# Patient Record
Sex: MALE | Race: OTHER | NOT HISPANIC OR LATINO | URBAN - METROPOLITAN AREA
[De-identification: names, ages, dates, MRNs, and addresses within clinical notes are randomized per-mention and may not be internally consistent; named-entity substitution may affect disease eponyms.]

---

## 2017-01-06 ENCOUNTER — EMERGENCY (EMERGENCY)
Facility: HOSPITAL | Age: 42
LOS: 1 days | Discharge: HOME | End: 2017-01-06
Attending: EMERGENCY MEDICINE | Admitting: EMERGENCY MEDICINE
Payer: COMMERCIAL

## 2017-01-06 VITALS
SYSTOLIC BLOOD PRESSURE: 129 MMHG | RESPIRATION RATE: 18 BRPM | HEART RATE: 57 BPM | TEMPERATURE: 98 F | DIASTOLIC BLOOD PRESSURE: 81 MMHG | OXYGEN SATURATION: 97 %

## 2017-01-06 DIAGNOSIS — H92.03 OTALGIA, BILATERAL: ICD-10-CM

## 2017-01-06 PROCEDURE — 99053 MED SERV 10PM-8AM 24 HR FAC: CPT

## 2017-01-06 PROCEDURE — 99282 EMERGENCY DEPT VISIT SF MDM: CPT

## 2017-01-06 PROCEDURE — 99282 EMERGENCY DEPT VISIT SF MDM: CPT | Mod: 25

## 2017-01-06 NOTE — ED PROVIDER NOTE - PHYSICAL EXAMINATION
EAC with small amount cerumen near external portion canals though TM visualized AU and pearly in appearance

## 2017-01-29 ENCOUNTER — EMERGENCY (EMERGENCY)
Facility: HOSPITAL | Age: 42
LOS: 1 days | Discharge: HOME | End: 2017-01-29
Attending: EMERGENCY MEDICINE | Admitting: EMERGENCY MEDICINE
Payer: COMMERCIAL

## 2017-01-29 VITALS
SYSTOLIC BLOOD PRESSURE: 134 MMHG | TEMPERATURE: 97 F | DIASTOLIC BLOOD PRESSURE: 85 MMHG | HEART RATE: 68 BPM | RESPIRATION RATE: 18 BRPM | WEIGHT: 175.27 LBS

## 2017-01-29 DIAGNOSIS — H92.03 OTALGIA, BILATERAL: ICD-10-CM

## 2017-01-29 PROCEDURE — 99053 MED SERV 10PM-8AM 24 HR FAC: CPT

## 2017-01-29 PROCEDURE — 99282 EMERGENCY DEPT VISIT SF MDM: CPT

## 2017-01-29 PROCEDURE — 99282 EMERGENCY DEPT VISIT SF MDM: CPT | Mod: 25

## 2017-01-29 NOTE — ED PROVIDER NOTE - ENMT NEGATIVE STATEMENT, MLM
Nose: no nasal congestion and no nasal drainage.Mouth/Throat: no dysphagia, no hoarseness and no throat pain.Neck: no lumps, no pain, no stiffness and no swollen glands. ears- see HPI

## 2017-01-29 NOTE — ED PROVIDER NOTE - ENMT, MLM
Airway patent, Nasal mucosa clear. Mouth with normal mucosa. Throat has no vesicles, no oropharyngeal exudates and uvula is midline.  b/l ears- normal exam, no signsof otitis externa or media, no foreign body

## 2017-01-29 NOTE — ED PROVIDER NOTE - OBJECTIVE STATEMENT
42 yo male in the ER with b/l earaches since yesterday, Denies fever, chills, n/v, denies sore throat , nasal congestion, cough or PRINCE.   Pt appears well, had no recent swimming, had no trauma to his ears, denies  discharge from his ears or decreased hearing. .

## 2017-01-29 NOTE — ED PROVIDER NOTE - MEDICAL DECISION MAKING DETAILS
40 yo male, well appearing, sleeping in the chair right after being triaged. No distress noted. Pt c/o b/l earache since yesterday. exam- unremarkable, no signs of otitis media or externa, no foreign body in the canals. pt stable for discharge, ENT f/u recommended if symptoms persist.

## 2017-03-02 ENCOUNTER — EMERGENCY (EMERGENCY)
Facility: HOSPITAL | Age: 42
LOS: 1 days | Discharge: HOME | End: 2017-03-02
Attending: EMERGENCY MEDICINE | Admitting: EMERGENCY MEDICINE
Payer: COMMERCIAL

## 2017-03-02 VITALS
SYSTOLIC BLOOD PRESSURE: 121 MMHG | OXYGEN SATURATION: 97 % | RESPIRATION RATE: 16 BRPM | TEMPERATURE: 98 F | WEIGHT: 169.98 LBS | DIASTOLIC BLOOD PRESSURE: 77 MMHG | HEART RATE: 62 BPM

## 2017-03-02 DIAGNOSIS — Z79.2 LONG TERM (CURRENT) USE OF ANTIBIOTICS: ICD-10-CM

## 2017-03-02 DIAGNOSIS — H92.02 OTALGIA, LEFT EAR: ICD-10-CM

## 2017-03-02 DIAGNOSIS — Z79.899 OTHER LONG TERM (CURRENT) DRUG THERAPY: ICD-10-CM

## 2017-03-02 DIAGNOSIS — H66.92 OTITIS MEDIA, UNSPECIFIED, LEFT EAR: ICD-10-CM

## 2017-03-02 PROCEDURE — 99053 MED SERV 10PM-8AM 24 HR FAC: CPT

## 2017-03-02 PROCEDURE — 99283 EMERGENCY DEPT VISIT LOW MDM: CPT | Mod: 25

## 2017-03-02 NOTE — ED ADULT TRIAGE NOTE - CHIEF COMPLAINT QUOTE
pt complaining of 5/10 b/l ear pain x 3 days r/t wax build up which is muffling his hearing. Hx of same issue in the past.

## 2017-03-03 RX ORDER — AMOXICILLIN 250 MG/5ML
1 SUSPENSION, RECONSTITUTED, ORAL (ML) ORAL
Qty: 21 | Refills: 0 | OUTPATIENT
Start: 2017-03-03 | End: 2017-03-10

## 2017-03-03 RX ORDER — CARBAMIDE PEROXIDE 81.86 MG/ML
5 SOLUTION/ DROPS AURICULAR (OTIC)
Qty: 1 | Refills: 0 | OUTPATIENT
Start: 2017-03-03 | End: 2017-03-06

## 2017-03-03 RX ORDER — AMOXICILLIN 250 MG/5ML
500 SUSPENSION, RECONSTITUTED, ORAL (ML) ORAL ONCE
Qty: 0 | Refills: 0 | Status: COMPLETED | OUTPATIENT
Start: 2017-03-03 | End: 2017-03-03

## 2017-03-03 RX ORDER — CARBAMIDE PEROXIDE 81.86 MG/ML
5 SOLUTION/ DROPS AURICULAR (OTIC)
Qty: 1 | Refills: 0 | OUTPATIENT
Start: 2017-03-03 | End: 2017-09-29

## 2017-03-03 NOTE — ED PROVIDER NOTE - MEDICAL DECISION MAKING DETAILS
L ear clogged sensation, no cerumen impaction on exam, erythema to L TM, will treat for early OM  -amoxicillin, recommend ENT f/u

## 2017-03-03 NOTE — ED PROVIDER NOTE - PROGRESS NOTE DETAILS
I have discussed the discharge plan with the patient. The patient agrees with the plan, as discussed.  The patient understands Emergency Department diagnosis is a preliminary diagnosis often based on limited information and that the patient must adhere to the follow-up plan as discussed.  The patient understands that if the symptoms worsen or if prescribed medications do not have the desired/planned effect that the patient may return to the Emergency Department at any time for further evaluation and treatment.

## 2017-03-03 NOTE — ED PROVIDER NOTE - CROS ED ENMT EARS POS
From: Saranya Ospina  To: Milana Parker NP  Sent: 1/4/2017 4:08 PM CST  Subject: Surgery    Knee replacement went well-was home by Vamshi Chandra. No anesthesia problems-I had a nurse anesthetist put me to sleep! Did get scope patch but sooo dry with it. Seen by hosputalist while inpatient.  
See below message from pt  Will to Milana RAJPUT for review  
clogged

## 2017-03-03 NOTE — ED PROVIDER NOTE - OBJECTIVE STATEMENT
41M no PMH c/o ear wax to L ear.  pt states ear feels clogged.  states has required peroxide in past to flush out wax.  no pain. no fevers. no cough. no congestion. no trauma.

## 2017-04-07 ENCOUNTER — EMERGENCY (EMERGENCY)
Facility: HOSPITAL | Age: 42
LOS: 1 days | Discharge: HOME | End: 2017-04-07
Attending: EMERGENCY MEDICINE | Admitting: EMERGENCY MEDICINE
Payer: COMMERCIAL

## 2017-04-07 VITALS
WEIGHT: 134.92 LBS | DIASTOLIC BLOOD PRESSURE: 78 MMHG | HEART RATE: 71 BPM | SYSTOLIC BLOOD PRESSURE: 116 MMHG | OXYGEN SATURATION: 100 % | RESPIRATION RATE: 18 BRPM | TEMPERATURE: 98 F | HEIGHT: 68 IN

## 2017-04-07 DIAGNOSIS — R39.9 UNSPECIFIED SYMPTOMS AND SIGNS INVOLVING THE GENITOURINARY SYSTEM: ICD-10-CM

## 2017-04-07 DIAGNOSIS — Z79.2 LONG TERM (CURRENT) USE OF ANTIBIOTICS: ICD-10-CM

## 2017-04-07 DIAGNOSIS — N48.89 OTHER SPECIFIED DISORDERS OF PENIS: ICD-10-CM

## 2017-04-07 DIAGNOSIS — Z79.899 OTHER LONG TERM (CURRENT) DRUG THERAPY: ICD-10-CM

## 2017-04-07 PROCEDURE — 99282 EMERGENCY DEPT VISIT SF MDM: CPT

## 2017-04-07 PROCEDURE — 99282 EMERGENCY DEPT VISIT SF MDM: CPT | Mod: 25

## 2017-04-07 NOTE — ED ADULT TRIAGE NOTE - CHIEF COMPLAINT QUOTE
Patient complaining of swollen penis refusing to get undressed. Patient denies unprotected sex. Last day to urinate was x 1 day ago. Bizzare affect

## 2017-04-07 NOTE — ED PROVIDER NOTE - OBJECTIVE STATEMENT
patient reports several months feeling swelling to phallus + states " 1/2 circumcised" denies dysuria and discharge denies unusual sexual activity

## 2017-04-07 NOTE — ED PROVIDER NOTE - PHYSICAL EXAMINATION
appears circumcised no overt swelling discharge or lesions noted no epididymal tenderness no hernia noted testes non tender

## 2017-04-13 PROCEDURE — 99283 EMERGENCY DEPT VISIT LOW MDM: CPT

## 2017-07-12 ENCOUNTER — EMERGENCY (EMERGENCY)
Facility: HOSPITAL | Age: 42
LOS: 1 days | Discharge: HOME | End: 2017-07-12
Admitting: EMERGENCY MEDICINE
Payer: COMMERCIAL

## 2017-07-12 VITALS
TEMPERATURE: 97 F | WEIGHT: 179.9 LBS | SYSTOLIC BLOOD PRESSURE: 129 MMHG | RESPIRATION RATE: 18 BRPM | HEART RATE: 71 BPM | DIASTOLIC BLOOD PRESSURE: 72 MMHG | OXYGEN SATURATION: 99 %

## 2017-07-12 DIAGNOSIS — Z79.899 OTHER LONG TERM (CURRENT) DRUG THERAPY: ICD-10-CM

## 2017-07-12 DIAGNOSIS — H92.03 OTALGIA, BILATERAL: ICD-10-CM

## 2017-07-12 DIAGNOSIS — Z79.2 LONG TERM (CURRENT) USE OF ANTIBIOTICS: ICD-10-CM

## 2017-07-12 PROCEDURE — 99282 EMERGENCY DEPT VISIT SF MDM: CPT | Mod: 25

## 2017-07-12 PROCEDURE — 99282 EMERGENCY DEPT VISIT SF MDM: CPT

## 2017-07-13 NOTE — ED PROVIDER NOTE - ENMT, MLM
Airway patent, Nasal mucosa clear. Mouth with normal mucosa. Throat has no vesicles, no oropharyngeal exudates and uvula is midline. EAC clear TM intact AU minimal cerumen noted

## 2017-07-13 NOTE — ED ADULT NURSE NOTE - OBJECTIVE STATEMENT
Patient to the ED for B/L ear pain, hx ear wax build up, seen and examined by MONI Flores, no build up in ear, patient cleared for discharge.

## 2017-07-13 NOTE — ED PROVIDER NOTE - OBJECTIVE STATEMENT
concerned about potential ear wax build up last week or so as has happened in past No trauma no fever

## 2017-09-26 ENCOUNTER — EMERGENCY (EMERGENCY)
Facility: HOSPITAL | Age: 42
LOS: 1 days | Discharge: HOME | End: 2017-09-26
Admitting: EMERGENCY MEDICINE
Payer: COMMERCIAL

## 2017-09-26 VITALS
HEIGHT: 66 IN | HEART RATE: 80 BPM | WEIGHT: 149.91 LBS | TEMPERATURE: 98 F | RESPIRATION RATE: 18 BRPM | OXYGEN SATURATION: 97 % | SYSTOLIC BLOOD PRESSURE: 126 MMHG | DIASTOLIC BLOOD PRESSURE: 81 MMHG

## 2017-09-26 DIAGNOSIS — H92.03 OTALGIA, BILATERAL: ICD-10-CM

## 2017-09-26 DIAGNOSIS — Z79.899 OTHER LONG TERM (CURRENT) DRUG THERAPY: ICD-10-CM

## 2017-09-26 PROCEDURE — 99283 EMERGENCY DEPT VISIT LOW MDM: CPT

## 2017-09-26 PROCEDURE — 99283 EMERGENCY DEPT VISIT LOW MDM: CPT | Mod: 25

## 2017-09-26 NOTE — ED PROVIDER NOTE - OBJECTIVE STATEMENT
40 y/o m presents stating has wax in his ears.  Pt stating needs to go to ENT every so often to be irrigated, ran out of "drops for the wax."  Denies pain, fever, all other ROS negative.

## 2017-10-20 ENCOUNTER — EMERGENCY (EMERGENCY)
Facility: HOSPITAL | Age: 42
LOS: 1 days | Discharge: HOME | End: 2017-10-20
Admitting: EMERGENCY MEDICINE
Payer: COMMERCIAL

## 2017-10-20 VITALS
HEART RATE: 80 BPM | TEMPERATURE: 98 F | RESPIRATION RATE: 16 BRPM | DIASTOLIC BLOOD PRESSURE: 55 MMHG | WEIGHT: 134.92 LBS | SYSTOLIC BLOOD PRESSURE: 90 MMHG | OXYGEN SATURATION: 98 %

## 2017-10-20 DIAGNOSIS — H92.03 OTALGIA, BILATERAL: ICD-10-CM

## 2017-10-20 DIAGNOSIS — Z79.2 LONG TERM (CURRENT) USE OF ANTIBIOTICS: ICD-10-CM

## 2017-10-20 DIAGNOSIS — Z79.899 OTHER LONG TERM (CURRENT) DRUG THERAPY: ICD-10-CM

## 2017-10-20 PROCEDURE — 99282 EMERGENCY DEPT VISIT SF MDM: CPT | Mod: 25

## 2017-10-20 PROCEDURE — 99282 EMERGENCY DEPT VISIT SF MDM: CPT

## 2017-10-20 NOTE — ED ADULT NURSE NOTE - OBJECTIVE STATEMENT
42 y/o male arrived to [facility] ER reporting bilateral eye pain for the past couple days with no discharge noted. Upon assessment, dry cerumen noted with no redness, abdomen soft, lung fields WNL, pupils are equal and reactive to light, breathing is equal and unlabored. Pt denies chest pain, headache, dizziness, blurred vision, slurred speech, hearing changes, palpitations, fever, chills, LOC, SOB, nausea, vomiting, diarrhea, fever, recent travel, recent injury. Care in progress. Awaiting disposition

## 2017-11-25 ENCOUNTER — EMERGENCY (EMERGENCY)
Facility: HOSPITAL | Age: 42
LOS: 1 days | Discharge: HOME | End: 2017-11-25
Attending: EMERGENCY MEDICINE | Admitting: EMERGENCY MEDICINE
Payer: COMMERCIAL

## 2017-11-25 VITALS
RESPIRATION RATE: 17 BRPM | TEMPERATURE: 97 F | HEART RATE: 62 BPM | DIASTOLIC BLOOD PRESSURE: 85 MMHG | SYSTOLIC BLOOD PRESSURE: 113 MMHG | OXYGEN SATURATION: 100 %

## 2017-11-25 DIAGNOSIS — Z79.899 OTHER LONG TERM (CURRENT) DRUG THERAPY: ICD-10-CM

## 2017-11-25 DIAGNOSIS — Z79.2 LONG TERM (CURRENT) USE OF ANTIBIOTICS: ICD-10-CM

## 2017-11-25 DIAGNOSIS — H93.8X3 OTHER SPECIFIED DISORDERS OF EAR, BILATERAL: ICD-10-CM

## 2017-11-25 DIAGNOSIS — H92.09 OTALGIA, UNSPECIFIED EAR: ICD-10-CM

## 2017-11-25 PROCEDURE — 99282 EMERGENCY DEPT VISIT SF MDM: CPT

## 2017-11-25 PROCEDURE — 99282 EMERGENCY DEPT VISIT SF MDM: CPT | Mod: 25

## 2017-11-25 NOTE — ED ADULT TRIAGE NOTE - ARRIVAL INFO ADDITIONAL COMMENTS
Pt walked into ED with c/o of b/l ear wax build up that is causing ear discomfort and muffled hearing. Pt. was given hydrogen peroxide to clear ears and no relief noted.

## 2017-11-25 NOTE — ED PROVIDER NOTE - OBJECTIVE STATEMENT
41M no PMH c/o wax to both ears. pt states over past few days c/o earwax obstruction.  used debrox.  no pain. no discharge. no fevers. no hearing change. has had multiple visits to ed for similar complaint.

## 2017-11-25 NOTE — ED PROVIDER NOTE - LEFT EAR
very minimal cerumen to inferior canal, able to fully visualize TM, no pain with pinna manipulation, no mastoid tenderness/TM clear

## 2017-11-25 NOTE — ED PROVIDER NOTE - MEDICAL DECISION MAKING DETAILS
concerned regarding ear wax, however no cerumen impaction on exam, no evidence of infection, no need for wax removal at this time.  no canal blockage. recommend ENT f/u

## 2018-05-16 NOTE — ED PROVIDER NOTE - NEURO NEGATIVE STATEMENT, MLM
no loss of consciousness, no gait abnormality, no headache, no sensory deficits, and no weakness. Referred To Mid-Level For Closure Text (Leave Blank If You Do Not Want): After obtaining clear surgical margins the patient was sent to a mid-level provider for surgical repair.  The patient understands they will receive post-surgical care and follow-up from the mid-level provider.

## 2019-11-01 NOTE — ED ADULT NURSE NOTE - CAS DISCH BELONGINGS RETURNED
[de-identified] : Mr. AD ARCINIEGA is a 74 year old male with history of diarrhea. Laboratory tests were normal. Patient has had resolution of all symptoms since using antispasmodics. He is eating a normal diet and feels well.\par 
Yes

## 2021-03-01 NOTE — ED ADULT NURSE NOTE - NS ED NURSE DC PT EDUCATION RESOURCES
Yes Alert and oriented to person, place, time/situation. normal mood and affect. no apparent risk to self or others.

## 2021-05-10 NOTE — ED ADULT NURSE NOTE - PMH
Last Office Visit  Next Office Visit none   Last Labs  Last Refill     Please advise     
No pertinent past medical history

## 2021-09-03 NOTE — ED ADULT NURSE NOTE - FALLEN IN THE PAST
Would recommend starting omeprazole 20 mg in am   Disp 30 and 2 refills  Should improve in one to two weeks   If morings are the worse then take it in the evening   no

## 2021-10-22 NOTE — ED PROVIDER NOTE - OBJECTIVE STATEMENT
reports one day of returned exacerbation of otalgia and reduced acuity No fever no URI + long term HX otalgia and cerumen concerns none

## 2024-05-08 NOTE — ED ADULT NURSE NOTE - OBJECTIVE STATEMENT
Calculated contrast threshold is 181.21 mL. Patient to the ED for B/L ear pan for the last 2 days/ Patient reported that he has  a history of ear wax build up where he gets his ears flushe out with hydrogen peroxide, last treatment was 2 months ago.  Associated symptoms to include muffling, irritation and pain.